# Patient Record
(demographics unavailable — no encounter records)

---

## 2018-07-09 NOTE — XRAY REPORT
Procedure Date:  07/09/2018   

Accession Number:  [id number] / [id number]                    

Procedure:  XR  - Tib/Fib RT CPT Code:  

 

FULL RESULT:

 

 

EXAM:

RIGHT TIBIA/FIBULA RADIOGRAPHY

 

EXAM DATE: 7/9/2018 04:16 PM.

 

CLINICAL HISTORY: Right lower leg/ankle injury today.

 

COMPARISON: None.

 

TECHNIQUE: 2 views.

 

FINDINGS:

Bones: Oblique fracture of distal fibular diaphysis with about 90% 

apposition. Otherwise unremarkable.

 

Joints: Unremarkable knee. Widening of ankle mortise medially indicating 

disruption of the deltoid ligament. There is disruption of distal 

interosseous ligament and distal tibiofibular ligament as well.

 

Soft Tissues: Mild soft tissue swelling.

IMPRESSION: Distal fibular fracture with associated ligamentous injuries.

 

[facility]IA

## 2018-07-09 NOTE — ED PHYSICIAN DOCUMENTATION
PD HPI LOWER EXT INJURY





- Stated complaint


Stated Complaint: FOOT INJURY





- Chief complaint


Chief Complaint: Ext Problem





- History obtained from


History obtained from: Patient





- History of Present Illness


PD HPI LOW EXT INJURY LOCATION: Right, Lower leg, Ankle


Type of injury: Twist (while moving a stove on a dolley.)


Where injury occurred: Work


Timing - onset: Today (just PTA)


Timing - details: Abrupt onset, Still present


Improved by: Rest


Worsened by: Moving, Palpating


Associated symptoms: Swelling.  No: Weakness, Numbness, Discolored


Contributing factors: No: Anticoagulated


Similar symptoms before: Has not had sx before


Recently seen: Not recently seen





Review of Systems


Nose: denies: Rhinorrhea / runny nose, Congestion


Throat: denies: Sore throat


Cardiac: denies: Chest pain / pressure


Respiratory: denies: Cough


GI: denies: Abdominal Pain, Nausea, Vomiting


Musculoskeletal: reports: Joint pain (right ankle), Joint swelling


Neurologic: denies: Focal weakness, Numbness, Headache, Head injury


Immunocompromised: denies: Immunocompromised





PD PAST MEDICAL HISTORY





- Past Medical History


Cardiovascular: None


Respiratory: None


Neuro: None


Endocrine/Autoimmune: None


Musculoskeletal: None





- Present Medications


Home Medications: 


 Ambulatory Orders











 Medication  Instructions  Recorded  Confirmed


 


HYDROcod/ACETAM 5/325 [Norco 5/325] 1 tab PO Q6H PRN #25 tablet 07/09/18 


 


Naproxen [Naprosyn] 500 mg PO BID PRN #30 tablet 07/09/18 














- Allergies


Allergies/Adverse Reactions: 


 Allergies











Allergy/AdvReac Type Severity Reaction Status Date / Time


 


No Known Drug Allergies Allergy   Verified 07/09/18 15:06














PD ED PE NORMAL





- Vitals


Vital signs reviewed: Yes





- General


General: Alert and oriented X 3, No acute distress (right ankle in splint and 

patient seems comfortable enough with that. ), Well developed/nourished





- HEENT


HEENT: Atraumatic





- Neck


Neck: Supple, no meningeal sign, No bony TTP, No adenopathy





- Cardiac


Cardiac: RRR, No murmur





- Respiratory


Respiratory: Clear bilaterally





- Abdomen


Abdomen: Soft, Non tender





- Back


Back: No spinal TTP





- Derm


Derm: Normal color, Warm and dry





- Extremities


Extremities: Other (right ankle with tenderness and swelling fibular side above 

the mortis itself. Also tender medial malleolus with swelling. Achilles intact. 

Normal pulses, color, cap refill distally. )





- Neuro


Neuro: Alert and oriented X 3, No motor deficit, No sensory deficit, Normal 

speech


Eye Opening: Spontaneous


Motor: Obeys Commands


Verbal: Oriented


GCS Score: 15





Results





- Vitals


Vitals: 


 Vital Signs - 24 hr











  07/09/18 07/09/18





  15:00 17:49


 


Temperature 36.6 C 


 


Heart Rate 86 85


 


Respiratory 18 18





Rate  


 


Blood Pressure 186/107 H 165/105 H


 


O2 Saturation 96 98








 Oxygen











O2 Source                      Room air

















- Rads (name of study)


  ** tib/fib and ankle


Radiology: Prelim report reviewed, EMP read contemporaneously (distal fibular 

fracture above the ankle of the mortis. Medially ankle with small avulsion 

fracture and widening of the medial mortis. )





Procedures





- Splint (location)


  ** right ankle


Splint applied by: Tech


Type of splint: Fiberglass, Posterior, Stirrup


Other: Patient tolerated well, No complications, Neurovascular intact, Crutches 

provided





PD MEDICAL DECISION MAKING





- ED course


Complexity details: reviewed results, re-evaluated patient (given IV Morphine 

to help with xray and splinting manipulations. ), considered differential, d/w 

patient, d/w consultant (Dr. Sosa - to splint and follow up in office. )





- Sepsis Event


Vital Signs: 


 Vital Signs - 24 hr











  07/09/18 07/09/18





  15:00 17:49


 


Temperature 36.6 C 


 


Heart Rate 86 85


 


Respiratory 18 18





Rate  


 


Blood Pressure 186/107 H 165/105 H


 


O2 Saturation 96 98








 Oxygen











O2 Source                      Room air

















Departure





- Departure


Disposition: 01 Home, Self Care


Clinical Impression: 


Bimalleolar fracture of right ankle


Qualifiers:


 Encounter type: initial encounter Fracture type: closed Qualified Code(s): 

S82.841A - Displaced bimalleolar fracture of right lower leg, initial encounter 

for closed fracture





Condition: Stable


Record reviewed to determine appropriate education?: Yes


Instructions:  ED Fx Ankle General


Follow-Up: 


Ge Sosa MD [Provider Admit Priv/Credential] - 


Prescriptions: 


HYDROcod/ACETAM 5/325 [Norco 5/325] 1 tab PO Q6H PRN #25 tablet


 PRN Reason: Pain


Naproxen [Naprosyn] 500 mg PO BID PRN #30 tablet


 PRN Reason: Pain


Comments: 


Keep the splint on until follow-up with orthopedics.  Call the orthopedic 

office tomorrow for an follow-up appointment for the end of the week.  Use 

crutches for nonweightbearing.  Ice elevate and rest the ankle often to reduce 

swelling.  





Use naproxen or ibuprofen twice daily for pain and inflammation.  Add Tylenol 

or hydrocodone if needed for pain.  





This might need surgery to help stabilize the ligaments and bones around the 

ankle so it is in good location.  Follow-up with orthopedics as planned for 

deciding if it needs surgery or just casting at that point.  Either way you 

will not be able to walk fully or be back to work for likely 4-6 weeks.


Forms:  Activity restrictions

## 2018-07-09 NOTE — XRAY REPORT
Procedure Date:  07/09/2018   

Accession Number:  [id number] / [id number]                    

Procedure:  XR  - Ankle 2 View RT CPT Code:  

 

FULL RESULT:

 

 

EXAM:

RIGHT ANKLE RADIOGRAPHY

 

EXAM DATE: 7/9/2018 04:42 PM.

 

CLINICAL HISTORY: Ankle injury.

 

COMPARISON: Same day.

 

TECHNIQUE: 1 views.

 

FINDINGS:

There is an oblique fracture of the distal fibular diaphysis with one 

half shaft width lateral displacement distal fragment. 3 mm mineralized 

density projects distal to the medial malleolus.

 

Mild widening of the medial ankle clear space. No dislocation evident. 

Soft tissue swelling.

IMPRESSION:

1. Mildly displaced distal fibula diaphyseal fracture.

2. Possible medial malleolus avulsion fracture. Widening of the medial 

clear space is consistent with ligamentous injury.

 

[facility]

## 2018-07-18 NOTE — OPERATIVE REPORT
DATE OF SERVICE: 07/17/2018

Physician: Salud Griffith MD

 

PREOPERATIVE DIAGNOSIS:  Right ankle fibula fracture with syndesmosis tear.

 

POSTOPERATIVE DIAGNOSIS:  Right ankle fibula fracture with syndesmosis tear.

 

PROCEDURE PERFORMED:  Right ankle open reduction internal fixation of lateral malleolus 

fracture with placement of syndesmosis screw for syndesmosis repair.

 

OPERATING SURGEON:  Salud Griffith MD

 

ASSISTANT:  General by Jt Perrin.

 

INDICATIONS FOR SURGERY:  The patient is a 36-year-old male status post an industrial injury of

his right ankle causing a fibula fracture with syndesmosis disruption and lateral talar 

subluxation.  Recommendation is that he undergo surgical repair.

 

FINDINGS AT SURGERY:  The patient's fracture was short oblique and easily reduced, and once 

plated demonstrated continued syndesmosis instability corrected by syndesmosis screw.  There 

was minor fragmentation off the medial malleolus, which was restored anatomically with fibular 

correction.

 

DESCRIPTION OF OPERATIVE PROCEDURE:  The patient was taken to the operating room, given a 

general anesthetic in a supine position.  Tourniquet on the right thigh.  The patient's hip was

rolled up with a bolster.  A surgical timeout was held, after which sterile prep and drape was 

completed and a lateral incision demarcated on the fibula approximately 3.5 inches in length.  

Under tourniquet control, the surgery was undertaken with a lateral incision, exposure of the 

fibula, irrigation and debridement of the fracture site, and reduction and fixation with a 

one-third Synthes tubular plate fixation.  Gait was very good.  Syndesmosis was tested with 

radiography and visual inspection, and it remained unstable and was corrected by placement of a

syndesmosis screw, 3.5 mm, capturing all cortices of fibula and tibia.  This restored congruity

alignment and the mortise.  The area was then flushed and irrigated.  Tourniquet was deflated. 

Cautery controlled bleeding.  Closure was with Vicryl interrupted subcutaneous and Monocryl 

closure of skin.  Infiltration was performed with Marcaine and lidocaine with epinephrine.  

Sterile dressings were applied.  The patient was then placed in a well-padded sugar-tong 

splint.  He was transported to a gurney and taken to recovery room in stable condition.

 

ESTIMATED BLOOD LOSS FOR THE PROCEDURE:  Minimal.

 

COMPLICATIONS:  None.

 

COUNTS:  Sponge and needle counts correct.

 

 

DD: 07/18/2018 08:30

TD: 07/18/2018 08:37

Job #: [id number]